# Patient Record
Sex: FEMALE | Race: WHITE | NOT HISPANIC OR LATINO | Employment: OTHER | URBAN - METROPOLITAN AREA
[De-identification: names, ages, dates, MRNs, and addresses within clinical notes are randomized per-mention and may not be internally consistent; named-entity substitution may affect disease eponyms.]

---

## 2018-06-22 PROBLEM — K85.90 ACUTE PANCREATITIS WITHOUT INFECTION OR NECROSIS: Status: ACTIVE | Noted: 2018-06-22

## 2018-09-19 PROBLEM — F32.A DEPRESSION WITH SUICIDAL IDEATION: Status: ACTIVE | Noted: 2018-09-19

## 2018-09-19 PROBLEM — R45.851 DEPRESSION WITH SUICIDAL IDEATION: Status: ACTIVE | Noted: 2018-09-19

## 2018-09-20 PROBLEM — F33.2 SEVERE RECURRENT MAJOR DEPRESSION WITHOUT PSYCHOTIC FEATURES: Status: ACTIVE | Noted: 2018-09-20

## 2018-09-20 PROBLEM — R45.851 SUICIDAL IDEATIONS: Status: ACTIVE | Noted: 2018-09-20

## 2018-09-26 PROBLEM — R45.851 SUICIDAL IDEATIONS: Status: RESOLVED | Noted: 2018-09-20 | Resolved: 2018-09-26

## 2019-01-10 ENCOUNTER — ANESTHESIA EVENT (OUTPATIENT)
Dept: SURGERY | Facility: HOSPITAL | Age: 39
End: 2019-01-10
Payer: MEDICAID

## 2019-01-10 ENCOUNTER — HOSPITAL ENCOUNTER (OUTPATIENT)
Dept: PREADMISSION TESTING | Facility: HOSPITAL | Age: 39
Discharge: HOME | End: 2019-01-10
Attending: SURGERY
Payer: MEDICAID

## 2019-01-10 ENCOUNTER — HOSPITAL ENCOUNTER (OUTPATIENT)
Dept: PULMONOLOGY | Facility: HOSPITAL | Age: 39
Discharge: HOME | End: 2019-01-10
Attending: SURGERY
Payer: MEDICAID

## 2019-01-10 VITALS — HEIGHT: 65 IN | WEIGHT: 240.19 LBS | BODY MASS INDEX: 40.02 KG/M2

## 2019-01-10 DIAGNOSIS — D17.22 LIPOMA OF LEFT SHOULDER: ICD-10-CM

## 2019-01-10 DIAGNOSIS — D17.22 LIPOMA OF LEFT SHOULDER: Primary | ICD-10-CM

## 2019-01-10 PROCEDURE — 93010 EKG 12-LEAD: ICD-10-PCS | Mod: ,,, | Performed by: INTERNAL MEDICINE

## 2019-01-10 PROCEDURE — 93010 ELECTROCARDIOGRAM REPORT: CPT | Mod: ,,, | Performed by: INTERNAL MEDICINE

## 2019-01-10 PROCEDURE — 93005 ELECTROCARDIOGRAM TRACING: CPT

## 2019-01-10 NOTE — DISCHARGE INSTRUCTIONS
Outpatient procedure instructions    Prep Review  Nothing to eat or drink after midnight unless your doctor tells you differently.    Bring your medication in the original containers.   Take medications as instructed by your doctor.    Wear something comfortable that is easy for you to take off and put on.   Do not wear any makeup, jewelry, or body piercings. Leave valuables at home or let your family member keep them for you. Do not bring them to the Surgery area.     Date/Day of Procedure: Wednesday 1-16-19    Arrival time: Somone will call you between 1 p.m. and 5 p.m. the workday before the procedure to give you an arrival time.   Report to the Emergency Room if asked to arrive at the hospital before 7:00 a.m.   Report to Patient Registration if asked to arrive after 7:00 a.m.   It is not necessary to report earlier than the time you are told.   Ignore any automated/computer generated calls telling to what time to report to the hospital.   Plan to be at the hospital for about 4 hours, however, it could be longer.

## 2019-01-16 ENCOUNTER — ANESTHESIA (OUTPATIENT)
Dept: SURGERY | Facility: HOSPITAL | Age: 39
End: 2019-01-16
Payer: MEDICAID

## 2019-01-16 ENCOUNTER — HOSPITAL ENCOUNTER (OUTPATIENT)
Facility: HOSPITAL | Age: 39
Discharge: HOME | End: 2019-01-16
Attending: SURGERY | Admitting: SURGERY
Payer: MEDICAID

## 2019-01-16 DIAGNOSIS — D17.22 LIPOMA OF LEFT SHOULDER: Primary | ICD-10-CM

## 2019-01-16 LAB
B-HCG UR QL: NEGATIVE
B-HCG UR QL: NEGATIVE

## 2019-01-16 PROCEDURE — 25000003 PHARM REV CODE 250

## 2019-01-16 PROCEDURE — 37000008 HC ANESTHESIA 1ST 15 MINUTES: Performed by: SURGERY

## 2019-01-16 PROCEDURE — 01610 ANES ALL PX NRV MUSC SHO&AX: CPT | Performed by: SURGERY

## 2019-01-16 PROCEDURE — 88304 TISSUE SPECIMEN TO PATHOLOGY - SURGERY: ICD-10-PCS | Mod: 26,,, | Performed by: PATHOLOGY

## 2019-01-16 PROCEDURE — 63600175 PHARM REV CODE 636 W HCPCS: Performed by: SURGERY

## 2019-01-16 PROCEDURE — 37000009 HC ANESTHESIA EA ADD 15 MINS: Performed by: SURGERY

## 2019-01-16 PROCEDURE — 88304 TISSUE EXAM BY PATHOLOGIST: CPT | Mod: 26,,, | Performed by: PATHOLOGY

## 2019-01-16 PROCEDURE — 36000707: Performed by: SURGERY

## 2019-01-16 PROCEDURE — 63600175 PHARM REV CODE 636 W HCPCS

## 2019-01-16 PROCEDURE — 25000003 PHARM REV CODE 250: Performed by: SURGERY

## 2019-01-16 PROCEDURE — 71000033 HC RECOVERY, INTIAL HOUR: Performed by: SURGERY

## 2019-01-16 PROCEDURE — 88304 TISSUE EXAM BY PATHOLOGIST: CPT | Performed by: PATHOLOGY

## 2019-01-16 PROCEDURE — 36000706: Performed by: SURGERY

## 2019-01-16 PROCEDURE — 81025 URINE PREGNANCY TEST: CPT

## 2019-01-16 PROCEDURE — 00300 ANES ALL PX INTEG H/N/PTRUNK: CPT | Mod: QZ

## 2019-01-16 RX ORDER — PHENYLEPHRINE HYDROCHLORIDE 10 MG/ML
INJECTION INTRAVENOUS
Status: DISCONTINUED | OUTPATIENT
Start: 2019-01-16 | End: 2019-01-16

## 2019-01-16 RX ORDER — CEFAZOLIN SODIUM 2 G/50ML
2 SOLUTION INTRAVENOUS
Status: COMPLETED | OUTPATIENT
Start: 2019-01-16 | End: 2019-01-16

## 2019-01-16 RX ORDER — SODIUM CHLORIDE, SODIUM LACTATE, POTASSIUM CHLORIDE, CALCIUM CHLORIDE 600; 310; 30; 20 MG/100ML; MG/100ML; MG/100ML; MG/100ML
INJECTION, SOLUTION INTRAVENOUS CONTINUOUS PRN
Status: DISCONTINUED | OUTPATIENT
Start: 2019-01-16 | End: 2019-01-16

## 2019-01-16 RX ORDER — ONDANSETRON 2 MG/ML
INJECTION INTRAMUSCULAR; INTRAVENOUS
Status: DISCONTINUED | OUTPATIENT
Start: 2019-01-16 | End: 2019-01-16

## 2019-01-16 RX ORDER — MIDAZOLAM HYDROCHLORIDE 1 MG/ML
INJECTION, SOLUTION INTRAMUSCULAR; INTRAVENOUS
Status: DISCONTINUED | OUTPATIENT
Start: 2019-01-16 | End: 2019-01-16

## 2019-01-16 RX ORDER — BUPIVACAINE HYDROCHLORIDE AND EPINEPHRINE 5; 5 MG/ML; UG/ML
INJECTION, SOLUTION EPIDURAL; INTRACAUDAL; PERINEURAL
Status: DISCONTINUED | OUTPATIENT
Start: 2019-01-16 | End: 2019-01-16 | Stop reason: HOSPADM

## 2019-01-16 RX ORDER — FENTANYL CITRATE 50 UG/ML
INJECTION, SOLUTION INTRAMUSCULAR; INTRAVENOUS
Status: DISCONTINUED | OUTPATIENT
Start: 2019-01-16 | End: 2019-01-16

## 2019-01-16 RX ORDER — GLYCOPYRROLATE 0.2 MG/ML
INJECTION INTRAMUSCULAR; INTRAVENOUS
Status: DISCONTINUED | OUTPATIENT
Start: 2019-01-16 | End: 2019-01-16

## 2019-01-16 RX ORDER — DEXAMETHASONE SODIUM PHOSPHATE 4 MG/ML
INJECTION, SOLUTION INTRA-ARTICULAR; INTRALESIONAL; INTRAMUSCULAR; INTRAVENOUS; SOFT TISSUE
Status: DISCONTINUED | OUTPATIENT
Start: 2019-01-16 | End: 2019-01-16

## 2019-01-16 RX ORDER — PROPOFOL 10 MG/ML
INJECTION, EMULSION INTRAVENOUS
Status: DISCONTINUED | OUTPATIENT
Start: 2019-01-16 | End: 2019-01-16

## 2019-01-16 RX ORDER — SODIUM CHLORIDE 9 MG/ML
INJECTION, SOLUTION INTRAVENOUS CONTINUOUS
Status: DISCONTINUED | OUTPATIENT
Start: 2019-01-16 | End: 2019-01-16 | Stop reason: HOSPADM

## 2019-01-16 RX ORDER — LIDOCAINE HCL/PF 100 MG/5ML
SYRINGE (ML) INTRAVENOUS
Status: DISCONTINUED | OUTPATIENT
Start: 2019-01-16 | End: 2019-01-16

## 2019-01-16 RX ORDER — HYDROCODONE BITARTRATE AND ACETAMINOPHEN 5; 325 MG/1; MG/1
TABLET ORAL
Qty: 41 TABLET | Refills: 0 | Status: SHIPPED | OUTPATIENT
Start: 2019-01-16

## 2019-01-16 RX ORDER — LIDOCAINE HYDROCHLORIDE 10 MG/ML
1 INJECTION, SOLUTION EPIDURAL; INFILTRATION; INTRACAUDAL; PERINEURAL ONCE
Status: DISCONTINUED | OUTPATIENT
Start: 2019-01-16 | End: 2019-01-16 | Stop reason: HOSPADM

## 2019-01-16 RX ADMIN — SODIUM CHLORIDE, SODIUM LACTATE, POTASSIUM CHLORIDE, AND CALCIUM CHLORIDE: .6; .31; .03; .02 INJECTION, SOLUTION INTRAVENOUS at 10:01

## 2019-01-16 RX ADMIN — CEFAZOLIN SODIUM 2 G: 2 SOLUTION INTRAVENOUS at 10:01

## 2019-01-16 RX ADMIN — FENTANYL CITRATE 50 MCG: 50 INJECTION, SOLUTION INTRAMUSCULAR; INTRAVENOUS at 10:01

## 2019-01-16 RX ADMIN — PROPOFOL 50 MG: 10 INJECTION, EMULSION INTRAVENOUS at 11:01

## 2019-01-16 RX ADMIN — DEXAMETHASONE SODIUM PHOSPHATE 4 MG: 4 INJECTION, SOLUTION INTRAMUSCULAR; INTRAVENOUS at 10:01

## 2019-01-16 RX ADMIN — PROPOFOL 50 MG: 10 INJECTION, EMULSION INTRAVENOUS at 10:01

## 2019-01-16 RX ADMIN — MIDAZOLAM 4 MG: 1 INJECTION INTRAMUSCULAR; INTRAVENOUS at 10:01

## 2019-01-16 RX ADMIN — PHENYLEPHRINE HYDROCHLORIDE 200 MCG: 10 INJECTION INTRAVENOUS at 11:01

## 2019-01-16 RX ADMIN — LIDOCAINE HYDROCHLORIDE 80 MG: 20 INJECTION, SOLUTION INTRAVENOUS at 10:01

## 2019-01-16 RX ADMIN — PROPOFOL 120 MG: 10 INJECTION, EMULSION INTRAVENOUS at 10:01

## 2019-01-16 RX ADMIN — GLYCOPYRROLATE 0.2 MG: 0.2 INJECTION INTRAMUSCULAR; INTRAVENOUS at 11:01

## 2019-01-16 RX ADMIN — ONDANSETRON 4 MG: 2 INJECTION, SOLUTION INTRAMUSCULAR; INTRAVENOUS at 10:01

## 2019-01-16 NOTE — OP NOTE
DATE OF PROCEDURE: 1/16/2019     PREOPERATIVE DIAGNOSES:   Lipoma of left shoulder [D17.22]    POSTOPERATIVE DIAGNOSES:   Post-Op Diagnosis Codes:     * Lipoma of left shoulder [D17.22]    PROCEDURES PERFORMED:   Procedure(s) (LRB):  EXCISION, LIPOMA (SHOULDER) (Left)    Surgeon(s) and Role:     * Elmo Ha MD - Primary    ANESTHESIA: MAC\Local    ESTIMATED BLOOD LOSS: 5 cc    SPECIMEN:   Specimen (12h ago, onward)    Start     Ordered    01/16/19 1105  Specimen to Pathology - Surgery  Once     Comments:  Pre dx- Left shoulder LipomaSpec # type Left shoulder lipomaPost- SameProc- Excision of left shoulder lipomaDr Ha     Start Status   01/16/19 1105 Needs to be Collected       01/16/19 1106          CLINICAL HISTORY AND INDICATION FOR SURGERY: Christiana Nugent is a 38 y.o. female with a lipoma of the left shoulder.     PROCEDURE IN DETAIL: The patient was explained the risks and benefits of the procedure. They agreed to the procedure. They were taken to the operating room on 1/16/2019. She was placed on OR table in the supine position. Her left shoulder was prepped and draped in a sterile manner. Local anesthesia was injected over the area. A 15 blade was used to make a 5 cm incision. Bovie was used to get down to the subcutaneous tissue and once there, there was no lipoma and it was obvious this mass was intramuscular. I then opened the fascia and spread the muscle to find an intact 3-4 cm lipoma. It was removed in its entirety. I then closed muscle with Vicryl, deep dermal with Vicryl, and skin with Monocryl. Sterile dressing was placed. The procedure was tolerated well. The patient was brought back to recovery in stable condition. All instrument and sponge counts were correct x 2.

## 2019-01-16 NOTE — TRANSFER OF CARE
Anesthesia Transfer of Care Note    Patient: Christiana Nugent    Procedure(s) Performed: Procedure(s) (LRB):  EXCISION, LIPOMA (SHOULDER) (Left)    Patient location: PACU    Anesthesia Type: general    Transport from OR: Transported from OR on 6-10 L/min O2 by face mask with adequate spontaneous ventilation    Post pain: adequate analgesia    Post assessment: no apparent anesthetic complications and tolerated procedure well    Post vital signs: stable    Level of consciousness: awake    Nausea/Vomiting: no nausea/vomiting    Complications: none    Transfer of care protocol was followed      Last vitals:   Visit Vitals  /87   LMP 01/07/2019   Breastfeeding? No

## 2019-01-16 NOTE — ANESTHESIA POSTPROCEDURE EVALUATION
Anesthesia Post Evaluation    Patient: Christiana Nugent    Procedure(s) Performed: Procedure(s) (LRB):  EXCISION, LIPOMA (SHOULDER) (Left)    Final Anesthesia Type: general  Patient location during evaluation: PACU  Patient participation: Yes- Able to Participate  Level of consciousness: awake and alert, oriented and awake  Post-procedure vital signs: reviewed and stable  Pain management: adequate  Airway patency: patent  PONV status at discharge: No PONV  Anesthetic complications: no      Cardiovascular status: blood pressure returned to baseline  Respiratory status: unassisted, spontaneous ventilation and room air  Hydration status: euvolemic  Follow-up not needed.  [facility]        Visit Vitals  BP (!) 95/55 (BP Location: Right arm, Patient Position: Lying)   Pulse 86   Temp 36.1 °C (97 °F)   Resp 18   LMP 01/07/2019   SpO2 97%   Breastfeeding? No       Pain/Rebel Score: No Data Recorded

## 2019-01-16 NOTE — DISCHARGE SUMMARY
[facility]  [facility] Surgery  Discharge Note  Short Stay    Admit Date: 1/16/2019    Discharge Date and Time: 01/16/2019    Attending Physician: Elmo Ha MD     Discharge Provider: Elmo Ha    Diagnoses:  Active Hospital Problems    Diagnosis  POA    *Lipoma of left shoulder [D17.22]  Yes      Resolved Hospital Problems   No resolved problems to display.       Discharged Condition: Good.    Hospital Course: Patient was admitted for an outpatient procedure and tolerated the procedure well with no complications.    Final Diagnoses: Same as principal diagnosis.    Disposition: Home or Self Care.     Follow up/Patient Instructions:    Medications:  Reconciled Home Medications:      Medication List      START taking these medications    HYDROcodone-acetaminophen 5-325 mg per tablet  Commonly known as:  NORCO  Take 1-2 every 6 hours prn pain.        CONTINUE taking these medications    aspirin 325 MG tablet  Take 325 mg by mouth once daily.     clonazePAM 0.5 MG tablet  Commonly known as:  KLONOPIN  TK 1 T PO BID PRN FOR ANXIETY     divalproex 250 MG EC tablet  Commonly known as:  DEPAKOTE  Take 1 tablet (250 mg total) by mouth every 8 (eight) hours.     DULoxetine 60 MG capsule  Commonly known as:  CYMBALTA  Take 1 capsule (60 mg total) by mouth 2 (two) times daily.     traZODone 100 MG tablet  Commonly known as:  DESYREL  TK 1 TO 2 T AT BEDTIME PRN FOR SLEEP     VRAYLAR 1.5 mg Cap  Generic drug:  cariprazine  TK 1 C PO QD          Discharge Procedure Orders   Diet general     Call MD for:  temperature >100.4     Call MD for:  persistent nausea and vomiting     Call MD for:  severe uncontrolled pain     Call MD for:  redness, tenderness, or signs of infection (pain, swelling, redness, odor or green/yellow discharge around incision site)     Remove dressing in 24 hours     Follow-up Information     Elmo Ha MD In 2 weeks.    Specialties:  General Surgery, Wound Care  Contact  information:  [address]  [place] [id number] [phone]

## 2019-01-16 NOTE — ANESTHESIA PREPROCEDURE EVALUATION
01/16/2019  Christiana Nugent is a 38 y.o., female.    Anesthesia Evaluation    I have reviewed the Patient Summary Reports.    I have reviewed the Nursing Notes.   I have reviewed the Medications.     Review of Systems  Anesthesia Hx:  No problems with previous Anesthesia  History of prior surgery of interest to airway management or planning:  Denies Personal Hx of Anesthesia complications.   Social:  Smoker, No Alcohol Use Hx opioid abuse   Hematology/Oncology:  Hematology Normal   Oncology Normal     EENT/Dental:EENT/Dental Normal   Cardiovascular:   Exercise tolerance: good Hypertension, well controlled    Pulmonary:  Pulmonary Normal    Renal/:  Renal/ Normal     Hepatic/GI:  Hepatic/GI Normal    Musculoskeletal:  Musculoskeletal Normal    Neurological:  Neurology Normal    Endocrine:  Endocrine Normal    Dermatological:  Skin Normal    Psych:   Psychiatric History anxiety depression bipolar         Physical Exam  General:  Well nourished    Airway/Jaw/Neck:  Airway Findings: Mouth Opening: Normal Tongue: Normal  General Airway Assessment: Adult  Mallampati: II  TM Distance: Normal, at least 6 cm            Mental Status:  Mental Status Findings:  Cooperative, Alert and Oriented         Anesthesia Plan  Type of Anesthesia, risks & benefits discussed:  Anesthesia Type:  general  Patient's Preference:   Intra-op Monitoring Plan: standard ASA monitors  Intra-op Monitoring Plan Comments:   Post Op Pain Control Plan: multimodal analgesia  Post Op Pain Control Plan Comments:   Induction:   IV  Beta Blocker:  Patient is not currently on a Beta-Blocker (No further documentation required).       Informed Consent: Patient understands risks and agrees with Anesthesia plan.  Questions answered. Anesthesia consent signed with patient.  ASA Score: 2     Day of Surgery Review of History & Physical: I have  interviewed and examined the patient. I have reviewed the patient's H&P dated: 1/16/19. There are no significant changes.  H&P update referred to the surgeon.         Ready For Surgery From Anesthesia Perspective.

## 2019-01-16 NOTE — DISCHARGE INSTRUCTIONS
Keep dressing dry and clean.  Reinforce dressing if  Bleeds  thru.  Do not drive, smoke, or drink alcohol for 8-10 hours today.  Take Rx as ordered.  Follow up with MD  Notify MD of any problems with incision  Return to clinic 2 weeks

## 2019-01-17 NOTE — H&P
[facility]  General Surgery  History & Physical    Patient Name: Christiana Nugent  MRN: [id number]  Admission Date: 2019  Attending Physician: No att. providers found   Primary Care Provider: Estephanie Castellanos MD    Patient information was obtained from patient.     Subjective:     Chief Complaint/Reason for Admission: Left shoulder mass    History of Present Illness:  Patient is a 38 y.o. female presents with a left shoulder mass. This has been growing and causing the patient discomfort. She has noticed it getting bigger. No skin changes, no drainage, no fever.     No current facility-administered medications on file prior to encounter.      Current Outpatient Medications on File Prior to Encounter   Medication Sig    divalproex (DEPAKOTE) 250 MG EC tablet Take 1 tablet (250 mg total) by mouth every 8 (eight) hours.    DULoxetine (CYMBALTA) 60 MG capsule Take 1 capsule (60 mg total) by mouth 2 (two) times daily.       Review of patient's allergies indicates:  No Known Allergies    Past Medical History:   Diagnosis Date    Addiction to drug     History of addiction to opiates    Anxiety     Bipolar affective disorder, depressed     Drug abuse     Hx of opiates    Fatigue     History of psychiatric hospitalization     First hospitalization in 20s, multiple in 2018    Hx of psychiatric care     Suboxone    Hypertension     Pancreatitis     Panic disorder     Psychiatric problem     Depression, anxiety, panic attacks    Sleep difficulties     Suicide attempt     Age 20 overdose    Therapy     [facility]    Withdrawal symptoms, drug or narcotic      Past Surgical History:   Procedure Laterality Date    BACK SURGERY      fusion and replacement     SECTION      ELBOW SURGERY Right     EXCISION, LIPOMA (SHOULDER) Left 2019    [facility]    FINGER SURGERY      NOSE SURGERY       Family History     Problem Relation  (Age of Onset)    Anxiety disorder Mother    Bipolar disorder Mother    Depression Father, Son, Son        Tobacco Use    Smoking status: Current Every Day Smoker     Packs/day: 0.25     Types: Cigarettes, Vaping with nicotine    Smokeless tobacco: Never Used   Substance and Sexual Activity    Alcohol use: No    Drug use: No    Sexual activity: Not Currently     Partners: Male     Birth control/protection: None     Comment: celibate x 4 years     Review of Systems   Constitutional: Negative.    Respiratory: Negative.    Cardiovascular: Negative.    Gastrointestinal: Negative.    Skin: Negative.  Negative for color change and wound.     Objective:     Vital Signs (Most Recent):  Temp: 97 °F (36.1 °C) (01/16/19 1118)  Pulse: 84 (01/16/19 1142)  Resp: 18 (01/16/19 1142)  BP: 105/69 (01/16/19 1142)  SpO2: (!) 92 % (01/16/19 1142) Vital Signs (24h Range):           There is no height or weight on file to calculate BMI.    Physical Exam   Constitutional: She appears well-developed and well-nourished. No distress.   Pulmonary/Chest: Effort normal and breath sounds normal. No stridor. No respiratory distress.   Abdominal: Soft. Bowel sounds are normal. She exhibits no distension. There is no tenderness.   Skin: Skin is warm and dry. She is not diaphoretic. No erythema. No pallor.   3-4 cm subcutaneous mass of the left trapezius area of the medial shoulder       Significant Labs:  CBC: No results for input(s): WBC, RBC, HGB, HCT, PLT, MCV, MCH, MCHC in the last 168 hours.  BMP: No results for input(s): GLU, NA, K, CL, CO2, BUN, CREATININE, CALCIUM, MG in the last 168 hours.    Significant Diagnostics:  I have reviewed and interpreted all pertinent imaging results/findings within the past 24 hours.    Assessment/Plan:     Active Diagnoses:    Diagnosis Date Noted POA    PRINCIPAL PROBLEM:  Lipoma of left shoulder [D17.22] 01/16/2019 Yes      Problems Resolved During this Admission:     VTE Risk Mitigation (From  admission, onward)        Ordered     IP VTE LOW RISK PATIENT  Once      01/16/19 1141        To OR today for excision of this mass.     Elmo Ha MD  General Surgery  [facility]

## 2019-02-20 VITALS
OXYGEN SATURATION: 92 % | HEART RATE: 84 BPM | TEMPERATURE: 97 F | SYSTOLIC BLOOD PRESSURE: 105 MMHG | RESPIRATION RATE: 18 BRPM | DIASTOLIC BLOOD PRESSURE: 69 MMHG

## (undated) DEVICE — SUT MONOCYRL 4-0 PS2 UND